# Patient Record
Sex: FEMALE | Race: BLACK OR AFRICAN AMERICAN | Employment: FULL TIME | URBAN - METROPOLITAN AREA
[De-identification: names, ages, dates, MRNs, and addresses within clinical notes are randomized per-mention and may not be internally consistent; named-entity substitution may affect disease eponyms.]

---

## 2023-11-02 ENCOUNTER — OFFICE VISIT (OUTPATIENT)
Age: 53
End: 2023-11-02

## 2023-11-02 VITALS
OXYGEN SATURATION: 96 % | WEIGHT: 177 LBS | HEIGHT: 64 IN | SYSTOLIC BLOOD PRESSURE: 158 MMHG | RESPIRATION RATE: 13 BRPM | TEMPERATURE: 98.1 F | BODY MASS INDEX: 30.22 KG/M2 | HEART RATE: 93 BPM | DIASTOLIC BLOOD PRESSURE: 96 MMHG

## 2023-11-02 DIAGNOSIS — E11.9 TYPE 2 DIABETES MELLITUS WITHOUT COMPLICATION, WITHOUT LONG-TERM CURRENT USE OF INSULIN (HCC): Primary | ICD-10-CM

## 2023-11-02 ASSESSMENT — PATIENT HEALTH QUESTIONNAIRE - PHQ9
SUM OF ALL RESPONSES TO PHQ QUESTIONS 1-9: 0
1. LITTLE INTEREST OR PLEASURE IN DOING THINGS: 0
2. FEELING DOWN, DEPRESSED OR HOPELESS: 0
SUM OF ALL RESPONSES TO PHQ QUESTIONS 1-9: 0
SUM OF ALL RESPONSES TO PHQ9 QUESTIONS 1 & 2: 0
SUM OF ALL RESPONSES TO PHQ QUESTIONS 1-9: 0
SUM OF ALL RESPONSES TO PHQ QUESTIONS 1-9: 0

## 2023-11-02 ASSESSMENT — ENCOUNTER SYMPTOMS
BACK PAIN: 1
SHORTNESS OF BREATH: 0
SINUS PRESSURE: 0
EYE PAIN: 0
NAUSEA: 0
EYES NEGATIVE: 1
COUGH: 0
ABDOMINAL PAIN: 0
RHINORRHEA: 0

## 2023-11-03 LAB — HBA1C MFR BLD: 15.4 % (ref 4.8–5.6)

## 2023-11-07 ENCOUNTER — TELEPHONE (OUTPATIENT)
Age: 53
End: 2023-11-07

## 2023-11-07 DIAGNOSIS — Z79.4 CONTROLLED TYPE 2 DIABETES MELLITUS WITH MILD NONPROLIFERATIVE RETINOPATHY WITHOUT MACULAR EDEMA, WITH LONG-TERM CURRENT USE OF INSULIN, UNSPECIFIED LATERALITY (HCC): Primary | ICD-10-CM

## 2023-11-07 DIAGNOSIS — E11.3299 CONTROLLED TYPE 2 DIABETES MELLITUS WITH MILD NONPROLIFERATIVE RETINOPATHY WITHOUT MACULAR EDEMA, WITH LONG-TERM CURRENT USE OF INSULIN, UNSPECIFIED LATERALITY (HCC): Primary | ICD-10-CM

## 2023-11-07 RX ORDER — SITAGLIPTIN 50 MG/1
TABLET, FILM COATED ORAL
COMMUNITY
Start: 2023-10-12 | End: 2023-11-07

## 2023-11-07 RX ORDER — EMPAGLIFLOZIN 25 MG/1
TABLET, FILM COATED ORAL
COMMUNITY
Start: 2023-08-21

## 2023-11-07 NOTE — TELEPHONE ENCOUNTER
Pt informed of lab result. Pt educated on need for dietary and lifestyle change and start of medication. Pt does have new PCP appt for next month. Pt advised to call PCP for expedited appointment. Ozempic Hx so started her on such until PCP can take over DM care.